# Patient Record
Sex: MALE | Race: WHITE | URBAN - METROPOLITAN AREA
[De-identification: names, ages, dates, MRNs, and addresses within clinical notes are randomized per-mention and may not be internally consistent; named-entity substitution may affect disease eponyms.]

---

## 2019-09-25 ENCOUNTER — CLINICAL SUPPORT (OUTPATIENT)
Dept: CARDIOLOGY CLINIC | Age: 44
End: 2019-09-25

## 2019-09-25 DIAGNOSIS — R00.2 PALPITATION: ICD-10-CM

## 2019-09-25 DIAGNOSIS — R00.2 PALPITATION: Primary | ICD-10-CM

## 2019-09-25 DIAGNOSIS — R00.2 PALPITATIONS: Primary | ICD-10-CM

## 2019-09-25 NOTE — PROGRESS NOTES
Order entered for Holter monitor 24 hours as requested by primary care Dr. Lorelei Escobar. Results to be sent to Dr. Lorelei Escobar.

## 2019-10-01 NOTE — PROGRESS NOTES
Cirilo Jerez please send these results to the ordering primary care doctor, if he has not been consulted by [facility] we simply performed the test as ordered.